# Patient Record
Sex: FEMALE | Race: WHITE | NOT HISPANIC OR LATINO | Employment: OTHER
[De-identification: names, ages, dates, MRNs, and addresses within clinical notes are randomized per-mention and may not be internally consistent; named-entity substitution may affect disease eponyms.]

---

## 2017-07-01 ENCOUNTER — HEALTH MAINTENANCE LETTER (OUTPATIENT)
Age: 52
End: 2017-07-01

## 2017-07-19 ENCOUNTER — OFFICE VISIT (OUTPATIENT)
Dept: INTERNAL MEDICINE | Facility: CLINIC | Age: 52
End: 2017-07-19
Payer: COMMERCIAL

## 2017-07-19 ENCOUNTER — HOSPITAL ENCOUNTER (OUTPATIENT)
Dept: MAMMOGRAPHY | Facility: CLINIC | Age: 52
Discharge: HOME | End: 2017-07-19
Attending: INTERNAL MEDICINE | Admitting: INTERNAL MEDICINE
Payer: COMMERCIAL

## 2017-07-19 VITALS
DIASTOLIC BLOOD PRESSURE: 80 MMHG | HEIGHT: 68 IN | HEART RATE: 95 BPM | TEMPERATURE: 98.1 F | BODY MASS INDEX: 27.37 KG/M2 | SYSTOLIC BLOOD PRESSURE: 112 MMHG | OXYGEN SATURATION: 95 % | WEIGHT: 180.6 LBS

## 2017-07-19 DIAGNOSIS — Z00.00 ENCOUNTER FOR ROUTINE ADULT HEALTH EXAMINATION WITHOUT ABNORMAL FINDINGS: Primary | ICD-10-CM

## 2017-07-19 DIAGNOSIS — Z00.00 PREVENTATIVE HEALTH CARE: ICD-10-CM

## 2017-07-19 LAB
ALBUMIN SERPL-MCNC: 3.6 G/DL (ref 3.4–5)
ALP SERPL-CCNC: 77 U/L (ref 40–150)
ALT SERPL W P-5'-P-CCNC: 24 U/L (ref 0–50)
ANION GAP SERPL CALCULATED.3IONS-SCNC: 5 MMOL/L (ref 3–14)
AST SERPL W P-5'-P-CCNC: 15 U/L (ref 0–45)
BILIRUB SERPL-MCNC: 0.4 MG/DL (ref 0.2–1.3)
BUN SERPL-MCNC: 12 MG/DL (ref 7–30)
CALCIUM SERPL-MCNC: 9.1 MG/DL (ref 8.5–10.1)
CHLORIDE SERPL-SCNC: 106 MMOL/L (ref 94–109)
CHOLEST SERPL-MCNC: 177 MG/DL
CO2 SERPL-SCNC: 30 MMOL/L (ref 20–32)
CREAT SERPL-MCNC: 0.68 MG/DL (ref 0.52–1.04)
GFR SERPL CREATININE-BSD FRML MDRD: NORMAL ML/MIN/1.7M2
GLUCOSE SERPL-MCNC: 83 MG/DL (ref 70–99)
HDLC SERPL-MCNC: 65 MG/DL
HGB BLD-MCNC: 12.8 G/DL (ref 11.7–15.7)
LDLC SERPL CALC-MCNC: 100 MG/DL
NONHDLC SERPL-MCNC: 112 MG/DL
POTASSIUM SERPL-SCNC: 5 MMOL/L (ref 3.4–5.3)
PROT SERPL-MCNC: 7.6 G/DL (ref 6.8–8.8)
SODIUM SERPL-SCNC: 141 MMOL/L (ref 133–144)
TRIGL SERPL-MCNC: 61 MG/DL

## 2017-07-19 PROCEDURE — G0202 SCR MAMMO BI INCL CAD: HCPCS

## 2017-07-19 PROCEDURE — 90471 IMMUNIZATION ADMIN: CPT | Performed by: INTERNAL MEDICINE

## 2017-07-19 PROCEDURE — 36415 COLL VENOUS BLD VENIPUNCTURE: CPT | Performed by: INTERNAL MEDICINE

## 2017-07-19 PROCEDURE — 87624 HPV HI-RISK TYP POOLED RSLT: CPT | Performed by: INTERNAL MEDICINE

## 2017-07-19 PROCEDURE — 99396 PREV VISIT EST AGE 40-64: CPT | Mod: 25 | Performed by: INTERNAL MEDICINE

## 2017-07-19 PROCEDURE — G0145 SCR C/V CYTO,THINLAYER,RESCR: HCPCS | Performed by: INTERNAL MEDICINE

## 2017-07-19 PROCEDURE — 80053 COMPREHEN METABOLIC PANEL: CPT | Performed by: INTERNAL MEDICINE

## 2017-07-19 PROCEDURE — 90715 TDAP VACCINE 7 YRS/> IM: CPT | Performed by: INTERNAL MEDICINE

## 2017-07-19 PROCEDURE — 80061 LIPID PANEL: CPT | Performed by: INTERNAL MEDICINE

## 2017-07-19 PROCEDURE — 85018 HEMOGLOBIN: CPT | Performed by: INTERNAL MEDICINE

## 2017-07-19 NOTE — NURSING NOTE
"Chief Complaint   Patient presents with     Physical     Fasting.       Initial Pulse 95  Temp 98.1  F (36.7  C) (Oral)  Ht 5' 7.5\" (1.715 m)  Wt 180 lb 9.6 oz (81.9 kg)  LMP 07/12/2017  SpO2 95%  BMI 27.87 kg/m2 Estimated body mass index is 27.87 kg/(m^2) as calculated from the following:    Height as of this encounter: 5' 7.5\" (1.715 m).    Weight as of this encounter: 180 lb 9.6 oz (81.9 kg).  Medication Reconciliation: complete     Amy You CMA      "

## 2017-07-19 NOTE — MR AVS SNAPSHOT
After Visit Summary   7/19/2017    Harmony Carrington    MRN: [id number]           Patient Information     Date Of Birth          1965        Visit Information        Provider Department      7/19/2017 8:00 AM Bairon Hunt MD [facility]        Today's Diagnoses     Encounter for routine adult health examination without abnormal findings    -  1      Care Instructions      Preventive Health Recommendations  Female Ages 50 - 64    Yearly exam: See your health care provider every year in order to  o Review health changes.   o Discuss preventive care.    o Review your medicines if your doctor has prescribed any.      Get a Pap test every three years (unless you have an abnormal result and your provider advises testing more often).    If you get Pap tests with HPV test, you only need to test every 5 years, unless you have an abnormal result.     You do not need a Pap test if your uterus was removed (hysterectomy) and you have not had cancer.    You should be tested each year for STDs (sexually transmitted diseases) if you're at risk.     Have a mammogram every 1 to 2 years.    Have a colonoscopy at age 50, or have a yearly FIT test (stool test). These exams screen for colon cancer.      Have a cholesterol test every 5 years, or more often if advised.    Have a diabetes test (fasting glucose) every three years. If you are at risk for diabetes, you should have this test more often.     If you are at risk for osteoporosis (brittle bone disease), think about having a bone density scan (DEXA).    Shots: Get a flu shot each year. Get a tetanus shot every 10 years.    Nutrition:     Eat at least 5 servings of fruits and vegetables each day.    Eat whole-grain bread, whole-wheat pasta and brown rice instead of white grains and rice.    Talk to your provider about Calcium and Vitamin D.     Lifestyle    Exercise at least 150 minutes a week (30 minutes a day, 5 days a week). This  "will help you control your weight and prevent disease.    Limit alcohol to one drink per day.    No smoking.     Wear sunscreen to prevent skin cancer.     See your dentist every six months for an exam and cleaning.    See your eye doctor every 1 to 2 years.            Follow-ups after your visit        Who to contact     If you have questions or need follow up information about today's clinic visit or your schedule please contact [facility] directly at [phone].  Normal or non-critical lab and imaging results will be communicated to you by PodTechhart, letter or phone within 4 business days after the clinic has received the results. If you do not hear from us within 7 days, please contact the clinic through BiOMt or phone. If you have a critical or abnormal lab result, we will notify you by phone as soon as possible.  Submit refill requests through mLED or call your pharmacy and they will forward the refill request to us. Please allow 3 business days for your refill to be completed.          Additional Information About Your Visit        mLED Information     mLED lets you send messages to your doctor, view your test results, renew your prescriptions, schedule appointments and more. To sign up, go to [email or web address] . Click on \"Log in\" on the left side of the screen, which will take you to the Welcome page. Then click on \"Sign up Now\" on the right side of the page.     You will be asked to enter the access code listed below, as well as some personal information. Please follow the directions to create your username and password.     Your access code is: [email or web address]  Expires: 10/17/2017  9:00 AM     Your access code will  in 90 days. If you need help or a new code, please call your [facility] or [phone].        Care EveryWhere ID     This is your Care EveryWhere ID. This could be used by other organizations to access your [facility] medical records  [id number]      " "  Your Vitals Were     Pulse Temperature Height Last Period Pulse Oximetry BMI (Body Mass Index)    95 98.1  F (36.7  C) (Oral) 5' 7.5\" (1.715 m) 07/12/2017 95% 27.87 kg/m2       Blood Pressure from Last 3 Encounters:   07/19/17 112/80   07/05/16 128/78   03/17/16 132/75    Weight from Last 3 Encounters:   07/19/17 180 lb 9.6 oz (81.9 kg)   07/05/16 176 lb (79.8 kg)   03/17/16 175 lb 12.8 oz (79.7 kg)              We Performed the Following     Comprehensive metabolic panel     Hemoglobin     Lipid panel reflex to direct LDL     Pap imaged thin layer screen with HPV - recommended age 30 - 65 years (select HPV order below)     TDAP VACCINE (ADACEL)        Primary Care Provider Office Phone # Fax #    Maynorjosiasi DORIS Hunt [phone]       [facility] [address] [place] [address]        Equal Access to Services     MOOSE BARKER : Hadii juancho garcia hadasho Soomaali, waaxda luqadaha, qaybta kaalmada adeegyada, akira mckeon . So [facility] [phone].    ATENCIÓN: Si habla español, tiene a barrios disposición servicios gratuitos de asistencia lingüística. Llame al [phone].    We comply with applicable federal civil rights laws and [place] laws. We do not discriminate on the basis of race, color, national origin, age, disability sex, sexual orientation or gender identity.            Thank you!     Thank you for choosing [facility]  for your care. Our goal is always to provide you with excellent care. Hearing back from our patients is one way we can continue to improve our services. Please take a few minutes to complete the written survey that you may receive in the mail after your visit with us. Thank you!             Your Updated Medication List - Protect others around you: Learn how to safely use, store and throw away your medicines at www.disposemymeds.org.          This list is accurate as of: 7/19/17  9:00 AM.  Always use your most " recent med list.                   Brand Name Dispense Instructions for use Diagnosis    OMEGA-3 FISH OIL PO      Take by mouth daily        VITAMIN D (CHOLECALCIFEROL) PO      Take by mouth daily

## 2017-07-19 NOTE — PROGRESS NOTES
SUBJECTIVE:   CC: Harmony Carrington is an 52 year old woman who presents for preventive health visit.     Healthy Habits:    Do you get at least three servings of calcium containing foods daily (dairy, green leafy vegetables, etc.)? yes    Amount of exercise or daily activities, outside of work: 3 day(s) per week    Problems taking medications regularly No    Medication side effects: No    Have you had an eye exam in the past two years? yes    Do you see a dentist twice per year? yes    Do you have sleep apnea, excessive snoring or daytime drowsiness?no           Today's PHQ-2 Score: PHQ-2 ( 1999 Pfizer) 7/19/2017 7/5/2016   Q1: Little interest or pleasure in doing things 0 0   Q2: Feeling down, depressed or hopeless 0 0   PHQ-2 Score 0 0         Abuse: Current or Past(Physical, Sexual or Emotional)- No  Do you feel safe in your environment - Yes    Past Medical History:   Diagnosis Date     NO ACTIVE PROBLEMS        Past Surgical History:   Procedure Laterality Date     C NONSPECIFIC PROCEDURE      wisdom teeth removal     COLONOSCOPY N/A 7/8/2015    Procedure: COLONOSCOPY;  Surgeon: Chucho Alberts MD;  Location:  GI         Current Outpatient Prescriptions   Medication Sig Dispense Refill     Omega-3 Fatty Acids (OMEGA-3 FISH OIL PO) Take by mouth daily       VITAMIN D, CHOLECALCIFEROL, PO Take by mouth daily         Family History   Problem Relation Age of Onset     CANCER Mother 76     gallbladder cancer     Alzheimer Disease Father        Social History   Substance Use Topics     Smoking status: Never Smoker     Smokeless tobacco: Never Used     Alcohol use No     The patient does not drink >3 drinks per day nor >7 drinks per week.    Reviewed orders with patient.  Reviewed health maintenance and updated orders accordingly - Yes       Pertinent mammograms are reviewed under the imaging tab.  History of abnormal Pap smear: NO - age 30- 65 PAP every 3 years recommended    Reviewed and updated as needed  "this visit by clinical staff  Tobacco  Allergies  Meds  Med Hx  Surg Hx  Fam Hx  Soc Hx        Reviewed and updated as needed this visit by Provider              ROS:  C: NEGATIVE for fever, chills, change in weight  I: NEGATIVE for worrisome rashes, moles or lesions  E: NEGATIVE for vision changes or irritation  ENT: NEGATIVE for ear, mouth and throat problems  R: NEGATIVE for significant cough or SOB  B: NEGATIVE for masses, tenderness or discharge  CV: NEGATIVE for chest pain, palpitations or peripheral edema  GI: NEGATIVE for nausea, abdominal pain, heartburn, or change in bowel habits  : NEGATIVE for unusual urinary or vaginal symptoms. Periods are irregular.  M: NEGATIVE for significant arthralgias or myalgia  N: NEGATIVE for weakness, dizziness or paresthesias  P: NEGATIVE for changes in mood or affect    OBJECTIVE:   /80  Pulse 95  Temp 98.1  F (36.7  C) (Oral)  Ht 5' 7.5\" (1.715 m)  Wt 180 lb 9.6 oz (81.9 kg)  LMP 07/12/2017  SpO2 95%  BMI 27.87 kg/m2  EXAM:  GENERAL: healthy, alert and no distress  EYES: Eyes grossly normal to inspection, PERRL and conjunctivae and sclerae normal  HENT: ear canals and TM's normal, nose and mouth without ulcers or lesions  NECK: no adenopathy, no asymmetry, masses, or scars and thyroid normal to palpation  RESP: lungs clear to auscultation - no rales, rhonchi or wheezes  BREAST: normal without masses, tenderness or nipple discharge and no palpable axillary masses or adenopathy  CV: regular rate and rhythm, normal S1 S2, no S3 or S4, no murmur, click or rub, no peripheral edema and peripheral pulses strong  ABDOMEN: soft, nontender, no hepatosplenomegaly, no masses and bowel sounds normal   (female): normal female external genitalia, normal urethral meatus, vaginal mucosa pink, moist, well rugated, and normal cervix/adnexa without tenderness,masses or discharge  MS: no gross musculoskeletal defects noted, no edema  NEURO: Normal strength and tone, " "mentation intact and speech normal  PSYCH: mentation appears normal, affect normal/bright    ASSESSMENT/PLAN:       (Z00.00) Encounter for routine adult health examination without abnormal findings  (primary encounter diagnosis)  Plan: Hemoglobin, Comprehensive metabolic panel,         Lipid panel reflex to direct LDL, Pap imaged         thin layer screen with HPV - recommended age 30        - 65 years (select HPV order below), TDAP         VACCINE (ADACEL)            COUNSELING:   Reviewed preventive health counseling, as reflected in patient instructions       Regular exercise       Healthy diet/nutrition       Immunizations    Vaccinated for: TDAP               reports that she has never smoked. She has never used smokeless tobacco.    Estimated body mass index is 27.87 kg/(m^2) as calculated from the following:    Height as of this encounter: 5' 7.5\" (1.715 m).    Weight as of this encounter: 180 lb 9.6 oz (81.9 kg).   Weight management plan: Discussed healthy diet and exercise guidelines and patient will follow up in 12 months in clinic to re-evaluate.    Counseling Resources:  ATP IV Guidelines  Pooled Cohorts Equation Calculator  Breast Cancer Risk Calculator  FRAX Risk Assessment  ICSI Preventive Guidelines  Dietary Guidelines for Americans, 2010  USDA's MyPlate  ASA Prophylaxis  Lung CA Screening    Bairon Hunt MD  [facility]  "

## 2017-07-21 LAB
COPATH REPORT: NORMAL
PAP: NORMAL

## 2017-07-25 LAB
FINAL DIAGNOSIS: NORMAL
HPV HR 12 DNA CVX QL NAA+PROBE: NEGATIVE
HPV16 DNA SPEC QL NAA+PROBE: NEGATIVE
HPV18 DNA SPEC QL NAA+PROBE: NEGATIVE
SPECIMEN DESCRIPTION: NORMAL

## 2017-07-25 NOTE — PROGRESS NOTES
Pap done on 7/19/17. Please send nl pap and Neg HPV letter, ([id number]) for pap in 3 years and annual physical in 1 year.   Chart reviewed,  raquel.     ERENDIRA Zhong RN

## 2018-09-22 ENCOUNTER — HEALTH MAINTENANCE LETTER (OUTPATIENT)
Age: 53
End: 2018-09-22

## 2021-05-27 ENCOUNTER — NURSE TRIAGE (OUTPATIENT)
Dept: NURSING | Facility: CLINIC | Age: 56
End: 2021-05-27

## 2022-05-27 ENCOUNTER — OFFICE VISIT (OUTPATIENT)
Dept: INTERNAL MEDICINE | Facility: CLINIC | Age: 57
End: 2022-05-27
Payer: COMMERCIAL

## 2022-05-27 ENCOUNTER — HOSPITAL ENCOUNTER (OUTPATIENT)
Dept: MAMMOGRAPHY | Facility: CLINIC | Age: 57
Discharge: HOME | End: 2022-05-27
Attending: INTERNAL MEDICINE | Admitting: INTERNAL MEDICINE
Payer: COMMERCIAL

## 2022-05-27 VITALS
SYSTOLIC BLOOD PRESSURE: 134 MMHG | HEART RATE: 104 BPM | RESPIRATION RATE: 16 BRPM | BODY MASS INDEX: 28.95 KG/M2 | HEIGHT: 68 IN | DIASTOLIC BLOOD PRESSURE: 80 MMHG | OXYGEN SATURATION: 97 % | WEIGHT: 191 LBS | TEMPERATURE: 98.3 F

## 2022-05-27 DIAGNOSIS — Z00.00 ROUTINE HISTORY AND PHYSICAL EXAMINATION OF ADULT: Primary | ICD-10-CM

## 2022-05-27 DIAGNOSIS — Z23 HIGH PRIORITY FOR 2019-NCOV VACCINE: ICD-10-CM

## 2022-05-27 DIAGNOSIS — K62.5 RECTAL BLEEDING: ICD-10-CM

## 2022-05-27 DIAGNOSIS — Z12.31 VISIT FOR SCREENING MAMMOGRAM: ICD-10-CM

## 2022-05-27 LAB
ERYTHROCYTE [DISTWIDTH] IN BLOOD BY AUTOMATED COUNT: 11.6 % (ref 10–15)
HCT VFR BLD AUTO: 40 % (ref 35–47)
HGB BLD-MCNC: 13.5 G/DL (ref 11.7–15.7)
MCH RBC QN AUTO: 31.2 PG (ref 26.5–33)
MCHC RBC AUTO-ENTMCNC: 33.8 G/DL (ref 31.5–36.5)
MCV RBC AUTO: 92 FL (ref 78–100)
PLATELET # BLD AUTO: 300 10E3/UL (ref 150–450)
RBC # BLD AUTO: 4.33 10E6/UL (ref 3.8–5.2)
WBC # BLD AUTO: 6.2 10E3/UL (ref 4–11)

## 2022-05-27 PROCEDURE — 99386 PREV VISIT NEW AGE 40-64: CPT | Mod: 25 | Performed by: INTERNAL MEDICINE

## 2022-05-27 PROCEDURE — 80053 COMPREHEN METABOLIC PANEL: CPT | Performed by: INTERNAL MEDICINE

## 2022-05-27 PROCEDURE — G0145 SCR C/V CYTO,THINLAYER,RESCR: HCPCS | Performed by: INTERNAL MEDICINE

## 2022-05-27 PROCEDURE — 0064A COVID-19,PF,MODERNA (18+ YRS BOOSTER .25ML): CPT | Performed by: INTERNAL MEDICINE

## 2022-05-27 PROCEDURE — 87624 HPV HI-RISK TYP POOLED RSLT: CPT | Performed by: INTERNAL MEDICINE

## 2022-05-27 PROCEDURE — 91306 COVID-19,PF,MODERNA (18+ YRS BOOSTER .25ML): CPT | Performed by: INTERNAL MEDICINE

## 2022-05-27 PROCEDURE — 77067 SCR MAMMO BI INCL CAD: CPT

## 2022-05-27 PROCEDURE — 80061 LIPID PANEL: CPT | Performed by: INTERNAL MEDICINE

## 2022-05-27 PROCEDURE — 85027 COMPLETE CBC AUTOMATED: CPT | Performed by: INTERNAL MEDICINE

## 2022-05-27 PROCEDURE — 84443 ASSAY THYROID STIM HORMONE: CPT | Performed by: INTERNAL MEDICINE

## 2022-05-27 PROCEDURE — 36415 COLL VENOUS BLD VENIPUNCTURE: CPT | Performed by: INTERNAL MEDICINE

## 2022-05-27 RX ORDER — CALCIUM CARBONATE 750 MG/1
750 TABLET, CHEWABLE ORAL DAILY
COMMUNITY

## 2022-05-27 ASSESSMENT — ENCOUNTER SYMPTOMS
EYE PAIN: 0
FEVER: 0
ARTHRALGIAS: 1
DIZZINESS: 0
JOINT SWELLING: 0
SHORTNESS OF BREATH: 0
WEAKNESS: 0
ABDOMINAL PAIN: 0
DYSURIA: 0
COUGH: 0
BREAST MASS: 0
HEMATOCHEZIA: 0
MYALGIAS: 0
CHILLS: 0
CONSTIPATION: 0
NAUSEA: 0
HEADACHES: 0
NERVOUS/ANXIOUS: 0
HEARTBURN: 0
PALPITATIONS: 0
DIARRHEA: 0
PARESTHESIAS: 0
FREQUENCY: 0
SORE THROAT: 0
HEMATURIA: 0

## 2022-05-27 NOTE — PROGRESS NOTES
SUBJECTIVE:   CC: Harmony Carrington is an 57 year old woman who presents for preventive health visit.       Patient has been advised of split billing requirements and indicates understanding: Yes  Healthy Habits:     Getting at least 3 servings of Calcium per day:  Yes    Bi-annual eye exam:  Yes    Dental care twice a year:  Yes    Sleep apnea or symptoms of sleep apnea:  None    Diet:  Other    Frequency of exercise:  4-5 days/week    Duration of exercise:  30-45 minutes    Taking medications regularly:  Yes    Medication side effects:  None    PHQ-2 Total Score: 0    Additional concerns today:  Yes       Pt c/o drops of blood in toilet bowl with BM and also blood in tissue wipes on and off since few months, no rectal pain , no tiching , c/o tail bone pain while sitting, no constipation, takes metamucil daily , last colonoscopy 2015          Today's PHQ-2 Score:   PHQ-2 ( 1999 Pfizer) 5/27/2022   Q1: Little interest or pleasure in doing things 0   Q2: Feeling down, depressed or hopeless 0   PHQ-2 Score 0   Q1: Little interest or pleasure in doing things Not at all   Q2: Feeling down, depressed or hopeless Not at all   PHQ-2 Score 0       Abuse: Current or Past (Physical, Sexual or Emotional) - No  Do you feel safe in your environment? Yes    Have you ever done Advance Care Planning? (For example, a Health Directive, POLST, or a discussion with a medical provider or your loved ones about your wishes): No, advance care planning information given to patient to review.  Patient declined advance care planning discussion at this time.      Past Medical History:   Diagnosis Date     NO ACTIVE PROBLEMS        Past Surgical History:   Procedure Laterality Date     COLONOSCOPY N/A 7/8/2015    Procedure: COLONOSCOPY;  Surgeon: Chucho Alberts MD;  Location: [facility] NONSPECIFIC PROCEDURE      wisdom teeth removal       Current Outpatient Medications   Medication Sig Dispense Refill     calcium carbonate 750 MG  CHEW Take 750 mg by mouth daily       Omega-3 Fatty Acids (OMEGA-3 FISH OIL PO) Take by mouth daily       UNABLE TO FIND MEDICATION NAME:hair skin and nails Vit       VITAMIN D, CHOLECALCIFEROL, PO Take by mouth daily         Family History   Problem Relation Age of Onset     Cancer Mother 76        gallbladder cancer     Alzheimer Disease Father        Social History     Tobacco Use     Smoking status: Never Smoker     Smokeless tobacco: Never Used   Substance Use Topics     Alcohol use: No         Alcohol Use 5/27/2022   Prescreen: >3 drinks/day or >7 drinks/week? No   Prescreen: >3 drinks/day or >7 drinks/week? -       Reviewed orders with patient.  Reviewed health maintenance and updated orders accordingly - Yes       Breast Cancer Screening:    Breast CA Risk Assessment (FHS-7) 5/27/2022   Do you have a family history of breast, colon, or ovarian cancer? No / Unknown      Pertinent mammograms are reviewed under the imaging tab.    History of abnormal Pap smear: NO - age 30-65 PAP every 5 years with negative HPV co-testing recommended  PAP / HPV Latest Ref Rng & Units 7/19/2017 8/30/2013 9/9/2010   PAP (Historical) - NIL NIL NIL   HPV16 NEG Negative - -   HPV18 NEG Negative - -   HRHPV NEG Negative - -     Reviewed and updated as needed this visit by clinical staff   Tobacco  Allergies  Meds   Med Hx  Surg Hx  Fam Hx  Soc Hx          Reviewed and updated as needed this visit by Provider                    Review of Systems   Constitutional: Negative for chills and fever.   HENT: Negative for congestion, ear pain, hearing loss and sore throat.    Eyes: Negative for pain and visual disturbance.   Respiratory: Negative for cough and shortness of breath.    Cardiovascular: Negative for chest pain, palpitations and peripheral edema.   Gastrointestinal: Negative for abdominal pain, constipation, diarrhea, heartburn, hematochezia and nausea.   Breasts:  Negative for tenderness, breast mass and discharge.  "  Genitourinary: Negative for dysuria, frequency, genital sores, hematuria, pelvic pain, urgency, vaginal bleeding and vaginal discharge.   Musculoskeletal: Positive for arthralgias. Negative for joint swelling and myalgias.   Skin: Negative for rash.   Neurological: Negative for dizziness, weakness, headaches and paresthesias.   Psychiatric/Behavioral: Negative for mood changes. The patient is not nervous/anxious.         OBJECTIVE:   /80   Pulse 104   Temp 98.3  F (36.8  C) (Oral)   Resp 16   Ht 1.715 m (5' 7.5\")   Wt 86.6 kg (191 lb)   LMP 07/12/2017   SpO2 97%   Breastfeeding No   BMI 29.47 kg/m    Physical Exam  GENERAL APPEARANCE: healthy, alert and no distress  EYES: Eyes grossly normal to inspection, PERRL and conjunctivae and sclerae normal  NECK: no adenopathy, no asymmetry, masses, or scars and thyroid normal to palpation  RESP: lungs clear to auscultation - no rales, rhonchi or wheezes  BREAST: normal without masses, tenderness or nipple discharge and no palpable axillary masses or adenopathy  CV: regular rate and rhythm, normal S1 S2,   ABDOMEN: soft, nontender, and bowel sounds normal   (female): normal female external genitalia, normal urethral meatus, vaginal mucosal atrophy noted, normal cervix, adnexae, without masses or abnormal discharge, pap obtained   No external hemorrhoids noted  MS: No tenderness on sacrococcygeal area, no musculoskeletal defects are noted and gait is age appropriate without ataxia  NEURO: Normal strength and tone, sensory exam grossly normal, mentation intact and speech normal  PSYCH: mentation appears normal and affect normal/bright     ASSESSMENT/PLAN:       (Z00.00) Routine history and physical examination of adult  (primary encounter diagnosis)  Plan: Lipid panel reflex to direct LDL Fasting,         Comprehensive metabolic panel, CBC with         platelets, TSH with free T4 reflex, Pap imaged         thin layer screen with HPV - recommended age 30      " "  - 65 years (select HPV order below)            (K62.5) Rectal bleeding  Plan: Colorectal Surgery Referral          (Z23) High priority for 2019-nCoV vaccine  Plan: COVID-19,PF,MODERNA (18+ Yrs BOOSTER .25mL)            Patient has been advised of split billing requirements and indicates understanding: Yes    COUNSELING:  Reviewed preventive health counseling, as reflected in patient instructions       Regular exercise       Healthy diet/nutrition       Immunizations    Vaccinated for: COVID-19 booster vaccine      Estimated body mass index is 29.47 kg/m  as calculated from the following:    Height as of this encounter: 1.715 m (5' 7.5\").    Weight as of this encounter: 86.6 kg (191 lb).    Weight management plan: Discussed healthy diet and exercise guidelines    She reports that she has never smoked. She has never used smokeless tobacco.      Counseling Resources:  ATP IV Guidelines  Pooled Cohorts Equation Calculator  Breast Cancer Risk Calculator  BRCA-Related Cancer Risk Assessment: FHS-7 Tool  FRAX Risk Assessment  ICSI Preventive Guidelines  Dietary Guidelines for Americans, 2010  USDA's MyPlate  ASA Prophylaxis  Lung CA Screening    Bairon Hunt MD  [facility]  "

## 2022-05-28 LAB
ALBUMIN SERPL-MCNC: 3.8 G/DL (ref 3.4–5)
ALP SERPL-CCNC: 111 U/L (ref 40–150)
ALT SERPL W P-5'-P-CCNC: 27 U/L (ref 0–50)
ANION GAP SERPL CALCULATED.3IONS-SCNC: 1 MMOL/L (ref 3–14)
AST SERPL W P-5'-P-CCNC: 18 U/L (ref 0–45)
BILIRUB SERPL-MCNC: 0.3 MG/DL (ref 0.2–1.3)
BUN SERPL-MCNC: 12 MG/DL (ref 7–30)
CALCIUM SERPL-MCNC: 9.6 MG/DL (ref 8.5–10.1)
CHLORIDE BLD-SCNC: 107 MMOL/L (ref 94–109)
CHOLEST SERPL-MCNC: 227 MG/DL
CO2 SERPL-SCNC: 31 MMOL/L (ref 20–32)
CREAT SERPL-MCNC: 0.68 MG/DL (ref 0.52–1.04)
FASTING STATUS PATIENT QL REPORTED: YES
GFR SERPL CREATININE-BSD FRML MDRD: >90 ML/MIN/1.73M2
GLUCOSE BLD-MCNC: 87 MG/DL (ref 70–99)
HDLC SERPL-MCNC: 62 MG/DL
LDLC SERPL CALC-MCNC: 140 MG/DL
NONHDLC SERPL-MCNC: 165 MG/DL
POTASSIUM BLD-SCNC: 5 MMOL/L (ref 3.4–5.3)
PROT SERPL-MCNC: 8 G/DL (ref 6.8–8.8)
SODIUM SERPL-SCNC: 139 MMOL/L (ref 133–144)
TRIGL SERPL-MCNC: 127 MG/DL
TSH SERPL DL<=0.005 MIU/L-ACNC: 1.03 MU/L (ref 0.4–4)

## 2022-06-01 LAB
BKR LAB AP GYN ADEQUACY: NORMAL
BKR LAB AP GYN INTERPRETATION: NORMAL
BKR LAB AP HPV REFLEX: NORMAL
BKR LAB AP PREVIOUS ABNORMAL: NORMAL
PATH REPORT.COMMENTS IMP SPEC: NORMAL
PATH REPORT.COMMENTS IMP SPEC: NORMAL
PATH REPORT.RELEVANT HX SPEC: NORMAL

## 2022-06-06 LAB
HUMAN PAPILLOMA VIRUS 16 DNA: NEGATIVE
HUMAN PAPILLOMA VIRUS 18 DNA: NEGATIVE
HUMAN PAPILLOMA VIRUS FINAL DIAGNOSIS: NORMAL
HUMAN PAPILLOMA VIRUS OTHER HR: NEGATIVE

## 2022-06-08 ENCOUNTER — TRANSFERRED RECORDS (OUTPATIENT)
Dept: HEALTH INFORMATION MANAGEMENT | Facility: CLINIC | Age: 57
End: 2022-06-08

## 2022-06-08 PROCEDURE — 88305 TISSUE EXAM BY PATHOLOGIST: CPT | Mod: 26 | Performed by: PATHOLOGY

## 2022-06-08 PROCEDURE — 88312 SPECIAL STAINS GROUP 1: CPT | Mod: 26 | Performed by: PATHOLOGY

## 2022-06-08 PROCEDURE — 88305 TISSUE EXAM BY PATHOLOGIST: CPT | Mod: TC,ORL | Performed by: PHYSICIAN ASSISTANT

## 2022-06-09 ENCOUNTER — LAB REQUISITION (OUTPATIENT)
Dept: LAB | Facility: CLINIC | Age: 57
End: 2022-06-09
Payer: COMMERCIAL

## 2022-06-09 DIAGNOSIS — L98.9 DISORDER OF THE SKIN AND SUBCUTANEOUS TISSUE, UNSPECIFIED: ICD-10-CM

## 2022-06-14 LAB
PATH REPORT.COMMENTS IMP SPEC: NORMAL
PATH REPORT.COMMENTS IMP SPEC: NORMAL
PATH REPORT.FINAL DX SPEC: NORMAL
PATH REPORT.GROSS SPEC: NORMAL
PATH REPORT.MICROSCOPIC SPEC OTHER STN: NORMAL
PATH REPORT.RELEVANT HX SPEC: NORMAL
PHOTO IMAGE: NORMAL

## 2023-01-24 ENCOUNTER — TELEPHONE (OUTPATIENT)
Dept: INTERNAL MEDICINE | Facility: CLINIC | Age: 58
End: 2023-01-24
Payer: COMMERCIAL

## 2023-01-24 DIAGNOSIS — M53.3 PAIN IN THE COCCYX: Primary | ICD-10-CM

## 2023-01-24 NOTE — TELEPHONE ENCOUNTER
Patient calling  She tailbone pain for over a year and wants to know if she needs a MRI or a referral to orthopedics. Please advise. Ok to call and luz marina [phone]

## 2023-01-24 NOTE — TELEPHONE ENCOUNTER
Orthopedic referral done, please inform patient that they will contact her to schedule appointment

## 2023-01-31 NOTE — PROGRESS NOTES
"ASSESSMENT & PLAN  Patient Instructions     1. Chronic midline low back pain without sciatica    2. Pain in the coccyx      -Patient has chronic pain of her tailbone whenever she is sitting for extended periods of time and when she is more active likely due to inflammation of the bones  -X-rays taken office today show multilevel degenerative changes of the lumbar spine  -Patient will get an MRI of the sacrum for further evaluation   - Your MRI has been ordered. You may call [phone] to schedule over the phone.   Please call my office 2 days after your MRI is complete to discuss results and next of treatment options.  -To consider short course of oral steroids and long-acting anti-inflammatories if the MRI does not show any concerning lesions  -Call direct clinic number [[phone]] at any time with questions or concerns.    Albert Yeo MD [facility]  [facility]          -----    SUBJECTIVE  Harmony Carrington is a/an 58 year old female who is seen in consultation at the request of  Bairon Hunt M.D. for evaluation of low back/tailbone pain. The patient is seen by themselves.    Onset: 9 month(s) ago. Reports insidious onset without acute precipitating event.  Location of Pain: midline low back radiating into sacrum/tailbone   Rating of Pain at worst: 10/10  Rating of Pain Currently: 8/10  Worsened by: extended periods of sitting, sitting on hard surface  Better with: heating pad, readjusting while sitting  Treatments tried: heating pad   Quality: throbbing   Red flags: Weakness: No, bowel/bladder loss: No, foot drop: No  Associated symptoms: no distal numbness or tingling; denies swelling or warmth  Orthopedic history: YES - Date: h/o \"many years\" chronic low back pain, treated with physical therapy many years ago, symptoms wax and wane   Relevant surgical history: NO  Social history: social history: works as  provider    Past Medical " "History:   Diagnosis Date     NO ACTIVE PROBLEMS      Social History     Socioeconomic History     Marital status:      Number of children: 2   Occupational History     Employer: SELF   Tobacco Use     Smoking status: Never     Smokeless tobacco: Never   Vaping Use     Vaping Use: Never used   Substance and Sexual Activity     Alcohol use: No     Drug use: No     Sexual activity: Yes     Partners: Male         Patient's past medical, surgical, social, and family histories were reviewed today and no changes are noted.    REVIEW OF SYSTEMS:  10 point ROS is negative other than symptoms noted above in HPI, Past Medical History or as stated below  Constitutional: NEGATIVE for fever, chills, change in weight  Skin: NEGATIVE for worrisome rashes, moles or lesions  GI/: NEGATIVE for bowel or bladder changes  Neuro: NEGATIVE for weakness, dizziness or paresthesias    OBJECTIVE:  BP (!) 144/88   Ht 1.715 m (5' 7.5\")   Wt 88.9 kg (196 lb)   LMP 07/12/2017   BMI 30.24 kg/m     General: healthy, alert and in no distress  HEENT: no scleral icterus or conjunctival erythema  Skin: no suspicious lesions or rash. No jaundice.  CV: no pedal edema  Resp: normal respiratory effort without conversational dyspnea   Psych: normal mood and affect  Gait: normal steady gait with appropriate coordination and balance  Neuro: normal light touch sensory exam of the bilateral lower extremities.  DTR's 2+ patella and achilles bilaterally.  MSK:  THORACIC/LUMBAR SPINE  Inspection:    No gross deformity/asymmetry  Palpation:  landmarks are nontender.  Range of Motion:     Lumbar flexion limited by tightness    Lumbar extension limited by tightness  Strength:    Full strength throughout hips/quads/hamstrings  Special Tests:    Positive: none      Independent visualization of the below image:  No results found for this or any previous visit (from the past 24 hour(s)).    X-rays taken of the lumbar spine shows mild intervertebral narrowing " and small anterior endplate osteophytes of the upper lumbar spine.  No acute fracture, listhesis or scoliosis.        Albert Yeo MD [facility]

## 2023-02-01 ENCOUNTER — OFFICE VISIT (OUTPATIENT)
Dept: ORTHOPEDICS | Facility: CLINIC | Age: 58
End: 2023-02-01
Attending: INTERNAL MEDICINE
Payer: COMMERCIAL

## 2023-02-01 ENCOUNTER — ANCILLARY PROCEDURE (OUTPATIENT)
Dept: GENERAL RADIOLOGY | Facility: CLINIC | Age: 58
End: 2023-02-01
Attending: FAMILY MEDICINE
Payer: COMMERCIAL

## 2023-02-01 VITALS
DIASTOLIC BLOOD PRESSURE: 88 MMHG | SYSTOLIC BLOOD PRESSURE: 144 MMHG | BODY MASS INDEX: 29.7 KG/M2 | HEIGHT: 68 IN | WEIGHT: 196 LBS

## 2023-02-01 DIAGNOSIS — M53.3 PAIN IN THE COCCYX: ICD-10-CM

## 2023-02-01 DIAGNOSIS — G89.29 CHRONIC MIDLINE LOW BACK PAIN WITHOUT SCIATICA: Primary | ICD-10-CM

## 2023-02-01 DIAGNOSIS — M54.50 CHRONIC MIDLINE LOW BACK PAIN WITHOUT SCIATICA: Primary | ICD-10-CM

## 2023-02-01 DIAGNOSIS — M54.50 LOW BACK PAIN: ICD-10-CM

## 2023-02-01 PROCEDURE — 72100 X-RAY EXAM L-S SPINE 2/3 VWS: CPT | Mod: TC | Performed by: RADIOLOGY

## 2023-02-01 PROCEDURE — 99204 OFFICE O/P NEW MOD 45 MIN: CPT | Performed by: FAMILY MEDICINE

## 2023-02-01 NOTE — LETTER
2/1/2023         RE: Harmony Carrington  [address] [facility] [address]        Dear Colleague,    Thank you for referring your patient, Harmony Carrington, to the [facility] [place]. Please see a copy of my visit note below.    ASSESSMENT & PLAN  Patient Instructions     1. Chronic midline low back pain without sciatica    2. Pain in the coccyx      -Patient has chronic pain of her tailbone whenever she is sitting for extended periods of time and when she is more active likely due to inflammation of the bones  -X-rays taken office today show multilevel degenerative changes of the lumbar spine  -Patient will get an MRI of the sacrum for further evaluation   - Your MRI has been ordered. You may call [phone] to schedule over the phone.   Please call my office 2 days after your MRI is complete to discuss results and next of treatment options.  -To consider short course of oral steroids and long-acting anti-inflammatories if the MRI does not show any concerning lesions  -Call direct clinic number [[phone]] at any time with questions or concerns.    Albert Yeo MD [facility]  [facility]          -----    SUBJECTIVE  Harmony Carrington is a/an 58 year old female who is seen in consultation at the request of  Bairon Hunt M.D. for evaluation of low back/tailbone pain. The patient is seen by themselves.    Onset: 9 month(s) ago. Reports insidious onset without acute precipitating event.  Location of Pain: midline low back radiating into sacrum/tailbone   Rating of Pain at worst: 10/10  Rating of Pain Currently: 8/10  Worsened by: extended periods of sitting, sitting on hard surface  Better with: heating pad, readjusting while sitting  Treatments tried: heating pad   Quality: throbbing   Red flags: Weakness: No, bowel/bladder loss: No, foot drop: No  Associated symptoms: no distal numbness or tingling;  "denies swelling or warmth  Orthopedic history: YES - Date: h/o \"many years\" chronic low back pain, treated with physical therapy many years ago, symptoms wax and wane   Relevant surgical history: NO  Social history: social history: works as  provider    Past Medical History:   Diagnosis Date     NO ACTIVE PROBLEMS      Social History     Socioeconomic History     Marital status:      Number of children: 2   Occupational History     Employer: SELF   Tobacco Use     Smoking status: Never     Smokeless tobacco: Never   Vaping Use     Vaping Use: Never used   Substance and Sexual Activity     Alcohol use: No     Drug use: No     Sexual activity: Yes     Partners: Male         Patient's past medical, surgical, social, and family histories were reviewed today and no changes are noted.    REVIEW OF SYSTEMS:  10 point ROS is negative other than symptoms noted above in HPI, Past Medical History or as stated below  Constitutional: NEGATIVE for fever, chills, change in weight  Skin: NEGATIVE for worrisome rashes, moles or lesions  GI/: NEGATIVE for bowel or bladder changes  Neuro: NEGATIVE for weakness, dizziness or paresthesias    OBJECTIVE:  BP (!) 144/88   Ht 1.715 m (5' 7.5\")   Wt 88.9 kg (196 lb)   LMP 07/12/2017   BMI 30.24 kg/m     General: healthy, alert and in no distress  HEENT: no scleral icterus or conjunctival erythema  Skin: no suspicious lesions or rash. No jaundice.  CV: no pedal edema  Resp: normal respiratory effort without conversational dyspnea   Psych: normal mood and affect  Gait: normal steady gait with appropriate coordination and balance  Neuro: normal light touch sensory exam of the bilateral lower extremities.  DTR's 2+ patella and achilles bilaterally.  MSK:  THORACIC/LUMBAR SPINE  Inspection:    No gross deformity/asymmetry  Palpation:  landmarks are nontender.  Range of Motion:     Lumbar flexion limited by tightness    Lumbar extension limited by tightness  Strength:    Full " strength throughout hips/quads/hamstrings  Special Tests:    Positive: none      Independent visualization of the below image:  No results found for this or any previous visit (from the past 24 hour(s)).    X-rays taken of the lumbar spine shows mild intervertebral narrowing and small anterior endplate osteophytes of the upper lumbar spine.  No acute fracture, listhesis or scoliosis.        Albert Yeo MD [facility]        Again, thank you for allowing me to participate in the care of your patient.        Sincerely,        Albert Yeo, MD

## 2023-02-01 NOTE — PATIENT INSTRUCTIONS
1. Chronic midline low back pain without sciatica    2. Pain in the coccyx      -Patient has chronic pain of her tailbone whenever she is sitting for extended periods of time and when she is more active likely due to inflammation of the bones  -X-rays taken office today show multilevel degenerative changes of the lumbar spine  -Patient will get an MRI of the sacrum for further evaluation   - Your MRI has been ordered. You may call [phone] to schedule over the phone.   Please call my office 2 days after your MRI is complete to discuss results and next of treatment options.  -To consider short course of oral steroids and long-acting anti-inflammatories if the MRI does not show any concerning lesions  -Call direct clinic number [[phone]] at any time with questions or concerns.    Albert Yeo MD [facility]  [facility] [place]

## 2023-02-08 ENCOUNTER — HOSPITAL ENCOUNTER (OUTPATIENT)
Dept: MRI IMAGING | Facility: CLINIC | Age: 58
Discharge: HOME | End: 2023-02-08
Attending: FAMILY MEDICINE | Admitting: FAMILY MEDICINE
Payer: COMMERCIAL

## 2023-02-08 DIAGNOSIS — M53.3 PAIN IN THE COCCYX: ICD-10-CM

## 2023-02-08 PROCEDURE — 72195 MRI PELVIS W/O DYE: CPT

## 2023-02-08 PROCEDURE — 72195 MRI PELVIS W/O DYE: CPT | Mod: 26 | Performed by: RADIOLOGY

## 2023-02-22 ENCOUNTER — TELEPHONE (OUTPATIENT)
Dept: ORTHOPEDICS | Facility: CLINIC | Age: 58
End: 2023-02-22
Payer: COMMERCIAL

## 2023-02-22 NOTE — TELEPHONE ENCOUNTER
Reason for Call:  Request for results:    Name of test or procedure: MRI of Sacrum and coccyx    Date of test of procedure: 2/8/23    Results: *MR pelvis/sacrum  without contrast 2/8/2023 6:30 PM     Techniques: Multiplanar multisequence imaging of the pelvis centered  on the sacrum and coccyx was obtained without  administration of  intra-articular or intravenous contrast using routine protocol.     History: Chronic sacral pain x 9 months when sitting and exercising.;  Pain in the coccyx     Comparison: CT 7/6/2016     Findings:     Osseous structures  Osseous structures: No fracture, stress reaction, avascular necrosis,  or focal osseous lesion is seen.     The coccyx is type I in morphology. 2 distinct coccygeal segments with  probable fusion of distal segments. Comment distal coccygeal dorsal  spur. Degenerative changes between the first and second coccygeal  segments with mild opposing endplate edema. Subcutaneous edema/fluid  dorsal to the coccyx.     Joint and periarticular soft tissue     Internal derangement of joints are not well assessed owing to chosen  field of view.  Mild visualized lumbar disc desiccation without  substantial disc height loss.     Muscles and tendons  Muscles: Visualized muscles are unremarkable without evidence of  muscle strain, atrophy or mass.      Tendons: The visualized tendons are intact.     Nerves:  Normal visualized course of the sciatic nerves.     Other Findings:  Colonic diverticula.                                                                      Impression:     No acute osseous abnormality. Mild degenerative changes at the  articulation between the first and second coccygeal segments.  Overlying dorsal fluid may indicate adventitial bursitis.     GELY SCOTT MD (Joe)       Plan for results from last OV: call patient    OK to leave the result message on voice mail or with a family member? YES    Phone number Patient can be reached at:  Cell number on file:     Telephone Information:   Mobile [phone]     Phone call to patient and she was informed that Dr. Yeo is out of the office until 2/24/23. Will have him contact with results then. She verbalized understanding.     Please contact patient with MRI results.    IVANNA Braun RN

## 2023-02-22 NOTE — TELEPHONE ENCOUNTER
[facility] Call Center    Phone Message    May a detailed message be left on voicemail: yes     Reason for Call: Other: Patient is requesting a call to discuss her MRI results     Action Taken: Other: [employer] [facility]    Travel Screening: Not Applicable

## 2023-02-28 ENCOUNTER — TELEPHONE (OUTPATIENT)
Dept: ORTHOPEDICS | Facility: CLINIC | Age: 58
End: 2023-02-28
Payer: COMMERCIAL

## 2023-02-28 NOTE — TELEPHONE ENCOUNTER
Duplicate encounter, see telephone encounter dated 2/22/23    Closing to avoid duplicate documentation    Mahnaz Alston, ATC

## 2023-02-28 NOTE — TELEPHONE ENCOUNTER
Called patient to discuss MRI results of the sacrum which showed mild degenerative changes and mild overlying bursitis.  Patient was informed that we could potentially try cortisone injection over the bursa to decrease the pain while she is sitting.  Patient is unsure if she would like to pursue that treatment at this time.  Patient states she is having ongoing low back pain to when she is shoveling snow.  Patient was offered formal physical therapy but she would like to wait and see how her back progresses.  Patient will call us if she would like to pursue any treatment in the future.  We also discussed referral to pain management specialist for further work-up and treatment.

## 2023-02-28 NOTE — TELEPHONE ENCOUNTER
[facility] Call Center    Phone Message    May a detailed message be left on voicemail: yes     Reason for Call: Other: Jacqueline is calling again she has not heard anything about her MRI results and she called on Friday.     Action Taken: Other: [employer]    Travel Screening: Not Applicable

## 2023-04-27 ENCOUNTER — PATIENT OUTREACH (OUTPATIENT)
Dept: CARE COORDINATION | Facility: CLINIC | Age: 58
End: 2023-04-27
Payer: COMMERCIAL

## 2023-05-11 ENCOUNTER — PATIENT OUTREACH (OUTPATIENT)
Dept: CARE COORDINATION | Facility: CLINIC | Age: 58
End: 2023-05-11
Payer: COMMERCIAL

## 2023-05-23 ENCOUNTER — PATIENT OUTREACH (OUTPATIENT)
Dept: CARE COORDINATION | Facility: CLINIC | Age: 58
End: 2023-05-23
Payer: COMMERCIAL

## 2025-06-08 ENCOUNTER — PATIENT OUTREACH (OUTPATIENT)
Dept: CARE COORDINATION | Facility: CLINIC | Age: 60
End: 2025-06-08
Payer: COMMERCIAL

## 2025-06-24 ENCOUNTER — TRANSFERRED RECORDS (OUTPATIENT)
Dept: HEALTH INFORMATION MANAGEMENT | Facility: CLINIC | Age: 60
End: 2025-06-24
Payer: COMMERCIAL